# Patient Record
Sex: FEMALE | Race: BLACK OR AFRICAN AMERICAN | NOT HISPANIC OR LATINO
[De-identification: names, ages, dates, MRNs, and addresses within clinical notes are randomized per-mention and may not be internally consistent; named-entity substitution may affect disease eponyms.]

---

## 2020-10-20 ENCOUNTER — DASHBOARD ENCOUNTERS (OUTPATIENT)
Age: 70
End: 2020-10-20

## 2020-10-20 ENCOUNTER — OFFICE VISIT (OUTPATIENT)
Dept: URBAN - NONMETROPOLITAN AREA CLINIC 2 | Facility: CLINIC | Age: 70
End: 2020-10-20
Payer: MEDICARE

## 2020-10-20 VITALS
TEMPERATURE: 96.6 F | SYSTOLIC BLOOD PRESSURE: 145 MMHG | DIASTOLIC BLOOD PRESSURE: 73 MMHG | HEIGHT: 69 IN | WEIGHT: 239 LBS | HEART RATE: 88 BPM | BODY MASS INDEX: 35.4 KG/M2

## 2020-10-20 DIAGNOSIS — K21.9 ESOPHAGEAL REFLUX: ICD-10-CM

## 2020-10-20 DIAGNOSIS — D50.9 IDA (IRON DEFICIENCY ANEMIA): ICD-10-CM

## 2020-10-20 DIAGNOSIS — R21 RASH: ICD-10-CM

## 2020-10-20 DIAGNOSIS — Z12.11 COLON CANCER SCREENING: ICD-10-CM

## 2020-10-20 DIAGNOSIS — B96.81 HELICOBACTER PYLORI INFECTION: ICD-10-CM

## 2020-10-20 PROBLEM — 1806006 RASH: Status: ACTIVE | Noted: 2020-10-20

## 2020-10-20 PROCEDURE — 99213 OFFICE O/P EST LOW 20 MIN: CPT | Performed by: NURSE PRACTITIONER

## 2020-10-20 PROCEDURE — 1036F TOBACCO NON-USER: CPT | Performed by: NURSE PRACTITIONER

## 2020-10-20 PROCEDURE — G8427 DOCREV CUR MEDS BY ELIG CLIN: HCPCS | Performed by: NURSE PRACTITIONER

## 2020-10-20 PROCEDURE — 3017F COLORECTAL CA SCREEN DOC REV: CPT | Performed by: NURSE PRACTITIONER

## 2020-10-20 RX ORDER — OMEPRAZOLE 10 MG/1
1 CAPSULE 30 MINUTES BEFORE MORNING MEAL CAPSULE, DELAYED RELEASE ORAL ONCE A DAY
Qty: 90 CAPSULES | Refills: 3 | OUTPATIENT
Start: 2020-10-20

## 2020-10-20 RX ORDER — NYSTATIN 100000 [USP'U]/G
1 APPLICATION OINTMENT TOPICAL TWICE A DAY
Qty: 1 TUBE | Refills: 1 | OUTPATIENT
Start: 2020-10-20 | End: 2020-11-16

## 2020-10-20 NOTE — HPI-TODAY'S VISIT:
05/28/2019   Mrs. Kingston presents for follow up of reflux, ELI, history of H. Pyloria, and history of breast cancer. Since her last visit she has undergone back surgery and thyroidectomy. She has a-fib and is on coumadin. She has had no s/s of GI bleeding. Her H/H is stable following with Dr. Escalona on Procrit if she drops below 10. Her bowels are regular. Her reflux is well controlled on omeprazole 10mg daily but has breakthrough if she skips a dose. She will have a DEXA this year with her PCP. Today she is doing well. MB  10/20/2020  Either presents for follow-up of reflux, ELI, history of H. pylori, and history of small bowel lesion status post repeat BC that was normal.  Since her last visit she has been doing well.  She needs a refill on her omeprazole 10 mg daily.  She does have a rash underneath her panniculus, this has been itching.  She was given a steroid ointment by her family doctor, but this is too expensive.  She has been using cortisone with no relief.  Today she is doing well otherwise with no new GI complaints.  MB

## 2021-03-11 ENCOUNTER — ERX REFILL RESPONSE (OUTPATIENT)
Dept: URBAN - NONMETROPOLITAN AREA CLINIC 2 | Facility: CLINIC | Age: 71
End: 2021-03-11

## 2021-03-11 RX ORDER — NYSTATIN 100000 [USP'U]/G
1 APPLICATION OINTMENT TOPICAL TWICE A DAY
Qty: 15 | Refills: 0